# Patient Record
Sex: FEMALE | Race: WHITE | URBAN - METROPOLITAN AREA
[De-identification: names, ages, dates, MRNs, and addresses within clinical notes are randomized per-mention and may not be internally consistent; named-entity substitution may affect disease eponyms.]

---

## 2017-03-27 ENCOUNTER — TELEPHONE (OUTPATIENT)
Dept: FAMILY MEDICINE | Facility: CLINIC | Age: 42
End: 2017-03-27

## 2017-03-27 NOTE — LETTER
March 27, 2017    Kaleigh Felder  [address] [facility] [place] [facility] [address]    Dear Kaleigh    We care about your health and have reviewed your health plan. We have reviewed your medical conditions, medication list, and lab results and are making recommendations based on this review, to better manage your health.    You are in particular need of attention regarding:  - Scheduling a Physical with a Cervical Cancer Screening (Pap Smear) age 64 and younger [phone]      Here is a list of Health Maintenance topics that are due now or due soon:  Health Maintenance Due   Topic Date Due     PAP SCREENING Q3 YR (SYSTEM ASSIGNED)  04/19/2009     We will be calling you in the next couple of weeks to help you schedule any appointments that are needed.  Please call us at [phone] (or use [employer]) to address the above recommendations.     Thank you for trusting [facility] and we appreciate the opportunity to serve you.  We look forward to supporting your healthcare needs in the future.    Healthy Regards,    [facility] Care Team/cll

## 2017-03-27 NOTE — LETTER
April 17, 2017    Kaleigh Felder  [address] [facility] [address]      Dear Kaleigh Felder,     We have tried to contact you about your health, but have been unable to reach you.  Please call us as soon as possible so we can provide you with the best care possible.  We will continue to check in with you throughout the year to complete these items of care, if you are not able to complete these items at this time.  If you would like to complete the missing items for your care, please contact us at [phone].    We recommend the following:  -schedule a PAP SMEAR EXAM which is due.  Please disregard this reminder if you have had this exam elsewhere within the last year.  It would be helpful for us to have a copy of your recent pap smear report in our file so that we can best coordinate your care.  -schedule a PHYSICAL with your provider.    Sincerely,     Your Care Team at [place]

## 2017-03-27 NOTE — TELEPHONE ENCOUNTER
Panel Management Review      Patient has the following on her problem list: None      Composite cancer screening  Chart review shows that this patient is due/due soon for the following Pap Smear  Summary:    Patient is due/failing the following:   PAP and PHYSICAL    Action needed:   Patient needs office visit for physical, pap.    Type of outreach:    Sent letter. 3/27/17    Questions for provider review:    None                                                                                                                                    Sugey Interiano [facility]        Chart routed to Care Team .

## 2017-04-10 NOTE — TELEPHONE ENCOUNTER
Called patient and left a message to return call and schedule an appointment for health maintenance items that are due.  Sugey Interiano CMA

## 2017-04-17 NOTE — TELEPHONE ENCOUNTER
Called patient and left a message to return call and schedule an appointment for health maintenance items that are due. Final PM letter mailed, closing encounter.  Sugey Interiano CMA

## 2017-07-19 ENCOUNTER — TELEPHONE (OUTPATIENT)
Dept: FAMILY MEDICINE | Facility: CLINIC | Age: 42
End: 2017-07-19

## 2017-07-19 NOTE — LETTER
July 19, 2017    Kaleigh Felder  [address] [place] [address]    Dear Kaleigh    We care about your health and have reviewed your health plan. We have reviewed your medical conditions, medication list, and lab results and are making recommendations based on this review, to better manage your health.    You are in particular need of attention regarding:  - Scheduling a Physical with a Cervical Cancer Screening (Pap Smear) age 64 and younger [phone]      Here is a list of Health Maintenance topics that are due now or due soon:  Health Maintenance Due   Topic Date Due     PAP SCREENING Q3 YR (SYSTEM ASSIGNED)  04/19/2009     We will be calling you in the next couple of weeks to help you schedule any appointments that are needed.  Please call us at [phone] (or use [employer]) to address the above recommendations.     Thank you for trusting [facility] and we appreciate the opportunity to serve you.  We look forward to supporting your healthcare needs in the future.    Healthy Regards,    [facility] Care Team/cll

## 2017-07-19 NOTE — TELEPHONE ENCOUNTER
Panel Management Review      Patient has the following on her problem list: None      Composite cancer screening  Chart review shows that this patient is due/due soon for the following Pap Smear  Summary:    Patient is due/failing the following:   PAP and PHYSICAL    Action needed:   Patient needs office visit for physical, pap.    Type of outreach:    Sent letter. 7/19/17    Questions for provider review:    None                                                                                                                                    Sugey Interiano [facility]        Chart routed to Care Team .

## 2017-07-19 NOTE — LETTER
August 9, 2017    Kaleigh Felder  [address] [place] [address]      Dear Kaleigh Felder,     We have tried to contact you about your health, but have been unable to reach you.  Please call us as soon as possible so we can provide you with the best care possible.  We will continue to check in with you throughout the year to complete these items of care, if you are not able to complete these items at this time.  If you would like to complete the missing items for your care, please contact us at [phone].    We recommend the following:  -schedule a PAP SMEAR EXAM which is due.  Please disregard this reminder if you have had this exam elsewhere within the last year.  It would be helpful for us to have a copy of your recent pap smear report in our file so that we can best coordinate your care.  -schedule a PHYSICAL with your provider.    Sincerely,     Your Care Team at [place]

## 2017-08-09 NOTE — TELEPHONE ENCOUNTER
Called patient and left a message to return call and schedule an appointment for health maintenance items that are due. Final PM letter mailed.  Sugey Interiano CMA

## 2017-08-16 DIAGNOSIS — F41.1 GAD (GENERALIZED ANXIETY DISORDER): ICD-10-CM

## 2017-08-16 RX ORDER — ESCITALOPRAM OXALATE 20 MG/1
TABLET ORAL
Qty: 30 TABLET | Refills: 0 | Status: SHIPPED | OUTPATIENT
Start: 2017-08-16 | End: 2017-09-17

## 2017-08-16 NOTE — TELEPHONE ENCOUNTER
escitalopram (LEXAPRO) 20 MG tablet     Last Written Prescription Date: 9-12-16  Last Fill Quantity: 90, # refills: 3  Last Office Visit with [facility] primary care provider:  9-12-16        Last PHQ-9 score on record=   PHQ-9 SCORE 9/12/2016   Total Score -   Total Score 10

## 2017-08-16 NOTE — TELEPHONE ENCOUNTER
Medication is being filled for 1 time refill only due to:  Patient needs to be seen because it has been more than one year since last visit. pt needs to be seen within a month. Amie Khan RN-BSN

## 2017-09-17 DIAGNOSIS — F41.1 GAD (GENERALIZED ANXIETY DISORDER): ICD-10-CM

## 2017-09-18 RX ORDER — ESCITALOPRAM OXALATE 20 MG/1
TABLET ORAL
Qty: 30 TABLET | Refills: 0 | Status: SHIPPED | OUTPATIENT
Start: 2017-09-18 | End: 2017-10-23

## 2017-09-18 NOTE — TELEPHONE ENCOUNTER
Routing refill request to provider for review/approval because:  Patient needs to be seen because it has been more than 1 year since last office visit.  PHQ 9 > 5  Terri Turner, FEI [facility] Triage.

## 2017-09-18 NOTE — TELEPHONE ENCOUNTER
escitalopram (LEXAPRO) 20 MG tablet     Last Written Prescription Date: 8/16/17  Last Fill Quantity: 30, # refills: 0  Last Office Visit with [facility] primary care provider:  9/12/16        Last PHQ-9 score on record=   PHQ-9 SCORE 9/12/2016   Total Score -   Total Score 10

## 2017-10-23 ENCOUNTER — OFFICE VISIT (OUTPATIENT)
Dept: FAMILY MEDICINE | Facility: CLINIC | Age: 42
End: 2017-10-23
Payer: COMMERCIAL

## 2017-10-23 VITALS
SYSTOLIC BLOOD PRESSURE: 133 MMHG | HEART RATE: 78 BPM | HEIGHT: 65 IN | OXYGEN SATURATION: 96 % | WEIGHT: 265 LBS | BODY MASS INDEX: 44.15 KG/M2 | TEMPERATURE: 98.1 F | DIASTOLIC BLOOD PRESSURE: 92 MMHG

## 2017-10-23 DIAGNOSIS — F41.1 GAD (GENERALIZED ANXIETY DISORDER): Primary | ICD-10-CM

## 2017-10-23 PROCEDURE — 99213 OFFICE O/P EST LOW 20 MIN: CPT | Performed by: FAMILY MEDICINE

## 2017-10-23 RX ORDER — ESCITALOPRAM OXALATE 20 MG/1
TABLET ORAL
Qty: 100 TABLET | Refills: 0 | Status: SHIPPED | OUTPATIENT
Start: 2017-10-23 | End: 2017-12-20

## 2017-10-23 ASSESSMENT — ANXIETY QUESTIONNAIRES
7. FEELING AFRAID AS IF SOMETHING AWFUL MIGHT HAPPEN: NOT AT ALL
2. NOT BEING ABLE TO STOP OR CONTROL WORRYING: SEVERAL DAYS
3. WORRYING TOO MUCH ABOUT DIFFERENT THINGS: NOT AT ALL
GAD7 TOTAL SCORE: 3
1. FEELING NERVOUS, ANXIOUS, OR ON EDGE: SEVERAL DAYS
5. BEING SO RESTLESS THAT IT IS HARD TO SIT STILL: NOT AT ALL
6. BECOMING EASILY ANNOYED OR IRRITABLE: SEVERAL DAYS
IF YOU CHECKED OFF ANY PROBLEMS ON THIS QUESTIONNAIRE, HOW DIFFICULT HAVE THESE PROBLEMS MADE IT FOR YOU TO DO YOUR WORK, TAKE CARE OF THINGS AT HOME, OR GET ALONG WITH OTHER PEOPLE: NOT DIFFICULT AT ALL

## 2017-10-23 ASSESSMENT — PAIN SCALES - GENERAL: PAINLEVEL: NO PAIN (0)

## 2017-10-23 ASSESSMENT — PATIENT HEALTH QUESTIONNAIRE - PHQ9
5. POOR APPETITE OR OVEREATING: NOT AT ALL
SUM OF ALL RESPONSES TO PHQ QUESTIONS 1-9: 9

## 2017-10-23 NOTE — NURSING NOTE
"Chief Complaint   Patient presents with     Anxiety       Initial BP (!) 133/92 (BP Location: Left arm, Patient Position: Chair, Cuff Size: Adult Small)  Pulse 78  Temp 98.1  F (36.7  C) (Oral)  Ht 5' 5\" (1.651 m)  Wt 265 lb (120.2 kg)  LMP 10/21/2017  SpO2 96%  BMI 44.1 kg/m2 Estimated body mass index is 44.1 kg/(m^2) as calculated from the following:    Height as of this encounter: 5' 5\" (1.651 m).    Weight as of this encounter: 265 lb (120.2 kg).  Medication Reconciliation: complete    Melanie Weiss MA    "

## 2017-10-23 NOTE — MR AVS SNAPSHOT
"              After Visit Summary   10/23/2017    Kaleigh Felder    MRN: [id number]           Patient Information     Date Of Birth          1975        Visit Information        Provider Department      10/23/2017 1:20 PM Engelmann, Lauren Anneliese, MD [facility]        Today's Diagnoses     BENJAMIN (generalized anxiety disorder)    -  1      Care Instructions    Come back when your schedule settles down for a physical.   Let me know when your dose of medication changes.          Follow-ups after your visit        Who to contact     If you have questions or need follow up information about today's clinic visit or your schedule please contact [facility] directly at [phone].  Normal or non-critical lab and imaging results will be communicated to you by MyChart, letter or phone within 4 business days after the clinic has received the results. If you do not hear from us within 7 days, please contact the clinic through MyChart or phone. If you have a critical or abnormal lab result, we will notify you by phone as soon as possible.  Submit refill requests through [employer] or call your pharmacy and they will forward the refill request to us. Please allow 3 business days for your refill to be completed.          Additional Information About Your Visit        MyChart Information     [employer] lets you send messages to your doctor, view your test results, renew your prescriptions, schedule appointments and more. To sign up, go to [email or web address] . Click on \"Log in\" on the left side of the screen, which will take you to the Welcome page. Then click on \"Sign up Now\" on the right side of the page.     You will be asked to enter the access code listed below, as well as some personal information. Please follow the directions to create your username and password.     Your access code is: [email or web address]  Expires: 2018  1:50 PM     Your access code will  in 90 days. If you " "need help or a new code, please call your [facility] clinic or [phone].        Care EveryWhere ID     This is your Care EveryWhere ID. This could be used by other organizations to access your [facility] medical records  [id number]        Your Vitals Were     Pulse Temperature Height Last Period Pulse Oximetry BMI (Body Mass Index)    78 98.1  F (36.7  C) (Oral) 5' 5\" (1.651 m) 10/21/2017 96% 44.1 kg/m2       Blood Pressure from Last 3 Encounters:   10/23/17 (!) 133/92   09/12/16 115/84   12/10/14 124/76    Weight from Last 3 Encounters:   10/23/17 265 lb (120.2 kg)   09/12/16 258 lb (117 kg)   12/10/14 242 lb (109.8 kg)              Today, you had the following     No orders found for display         Today's Medication Changes          These changes are accurate as of: 10/23/17  1:50 PM.  If you have any questions, ask your nurse or doctor.               These medicines have changed or have updated prescriptions.        Dose/Directions    escitalopram 20 MG tablet   Commonly known as:  LEXAPRO   This may have changed:  See the new instructions.   Used for:  BENJAMIN (generalized anxiety disorder)   Changed by:  Engelmann, Lauren Anneliese, MD        Take one and a half tablets daily for 1 month, then go back to 1 tablet per day.   Quantity:  100 tablet   Refills:  0            Where to get your medicines      These medications were sent to [facility]/pharmacy #[id number] - [address] AT CORNER OF [address] [place] [address]     Phone:  [phone]     escitalopram 20 MG tablet                Primary Care Provider Office Phone # Fax #    [facility] [phone]       [address] [facility] [address]        Equal Access to Services     KALPANA SPARKS : Ania Lay, waaxda luyvette, qaybta kaalmada kianna, oralia hedrick. So [facility] [phone].    ATENCIÓN: Si habla español, tiene a long disposición " servicios gratuitos de asistencia lingüística. [facility] [phone].    We comply with applicable federal civil rights laws and [place] laws. We do not discriminate on the basis of race, color, national origin, age, disability, sex, sexual orientation, or gender identity.            Thank you!     Thank you for choosing [facility]  for your care. Our goal is always to provide you with excellent care. Hearing back from our patients is one way we can continue to improve our services. Please take a few minutes to complete the written survey that you may receive in the mail after your visit with us. Thank you!             Your Updated Medication List - Protect others around you: Learn how to safely use, store and throw away your medicines at www.disposemymeds.org.          This list is accurate as of: 10/23/17  1:50 PM.  Always use your most recent med list.                   Brand Name Dispense Instructions for use Diagnosis    EQL COQ10 300 MG Caps   Generic drug:  Coenzyme Q10      Take 1 capsule by mouth daily        escitalopram 20 MG tablet    LEXAPRO    100 tablet    Take one and a half tablets daily for 1 month, then go back to 1 tablet per day.    BENJAMIN (generalized anxiety disorder)       VITAMINS/MINERALS PO      Take  by mouth.

## 2017-10-23 NOTE — PROGRESS NOTES
"  SUBJECTIVE:   Kaleigh Felder is a 42 year old female who presents to clinic today for the following health issues:    Depression and Anxiety Follow-Up    Status since last visit: Worsened -  has been having seizures and is having a hard time with that.    Other associated symptoms:None    Complicating factors:     Significant life event:  being ill, promotion at work    Current substance abuse: None    PHQ-9 Score and MyChart F/U Questions 9/12/2016 10/23/2017   Total Score 10 9   Q9: Suicide Ideation Not at all Not at all     BENJAMIN-7 SCORE 9/19/2013 9/12/2016 10/23/2017   Total Score 14 - -   Total Score - 6 3     PHQ-9  English  PHQ-9   Any Language  GAD7  Suicide Assessment Five-step Evaluation and Treatment (SAFE-T)      Amount of exercise or physical activity: Walking sometimes    Problems taking medications regularly: No    Medication side effects: none    Diet: regular (no restrictions)    Has been on it for about 10 years and doing very well, initially started on it for BENJAMIN.   Last week and a half has been taking 30mg of Lexapro. Her prior PCP advised her to do this.     Problem list and histories reviewed & adjusted, as indicated.  Additional history: as documented      Reviewed and updated as needed this visit by clinical staffTobacco  Allergies  Meds  Med Hx  Surg Hx  Fam Hx  Soc Hx      Reviewed and updated as needed this visit by Provider         ROS:  Constitutional, HEENT, cardiovascular, pulmonary, gi and gu systems are negative, except as otherwise noted.      OBJECTIVE:   BP (!) 133/92 (BP Location: Left arm, Patient Position: Chair, Cuff Size: Adult Small)  Pulse 78  Temp 98.1  F (36.7  C) (Oral)  Ht 5' 5\" (1.651 m)  Wt 265 lb (120.2 kg)  LMP 10/21/2017  SpO2 96%  BMI 44.1 kg/m2  Body mass index is 44.1 kg/(m^2).  GENERAL: healthy, alert, no distress and obese  EYES: Eyes grossly normal to inspection, PERRL and conjunctivae and sclerae normal  NECK: no adenopathy, no " "asymmetry, masses, or scars and thyroid normal to palpation  RESP: lungs clear to auscultation - no rales, rhonchi or wheezes  CV: regular rate and rhythm, normal S1 S2, no S3 or S4, no murmur, click or rub, no peripheral edema and peripheral pulses strong  PSYCH: MENTAL STATUS EXAM  Appearance: appropriate, well-groomed   Attitude: cooperative, engaged in conversation   Behavior: normal, no psychomotor agitation or retardation   Eye Contact: normal  Speech: normal valentin, normal volume   Orientation: oriented to person, place, time and situation  Mood: \"stressed\"  Affect: bright, mood-congruent   Thought Process: tangential but redirectable   Suicidal Ideation: No passive or active SI or plan, no thoughts of self-harm, reports children as protective factor   Hallucination: no A or V hallucinations      Diagnostic Test Results:  none     ASSESSMENT/PLAN:       ICD-10-CM    1. BENJAMIN (generalized anxiety disorder) F41.1 escitalopram (LEXAPRO) 20 MG tablet     Discussed risk of taking over the recommended dose of Lexapro, including risk of QT prolongation and bleeding risk. She is adamant that she was told she could do this for a short time by her previous provider.  I advised against it but she said she would do it anyway. Will give her enough tabs to cover to avoid withdrawal syndrome.     See Patient Instructions    Lauren A. Engelmann, MD  [facility]    "

## 2017-10-23 NOTE — PATIENT INSTRUCTIONS
Come back when your schedule settles down for a physical.   Let me know when your dose of medication changes.

## 2017-10-24 ASSESSMENT — ANXIETY QUESTIONNAIRES: GAD7 TOTAL SCORE: 3

## 2017-12-20 ENCOUNTER — TELEPHONE (OUTPATIENT)
Dept: FAMILY MEDICINE | Facility: CLINIC | Age: 42
End: 2017-12-20

## 2017-12-20 DIAGNOSIS — F41.1 GAD (GENERALIZED ANXIETY DISORDER): ICD-10-CM

## 2017-12-21 ENCOUNTER — TELEPHONE (OUTPATIENT)
Dept: FAMILY MEDICINE | Facility: CLINIC | Age: 42
End: 2017-12-21

## 2017-12-21 NOTE — LETTER
December 21, 2017    Kaleigh Felder  [address] [place] [address]    Dear Kaleigh    We care about your health and have reviewed your health plan. We have reviewed your medical conditions, medication list, and lab results and are making recommendations based on this review, to better manage your health.    You are in particular need of attention regarding:  - Scheduling a Physical with a Cervical Cancer Screening (Pap Smear) age 64 and younger [phone]      Here is a list of Health Maintenance topics that are due now or due soon:  Health Maintenance Due   Topic Date Due     PAP SCREENING Q3 YR (SYSTEM ASSIGNED)  04/19/2009     INFLUENZA VACCINE (SYSTEM ASSIGNED)  09/01/2017     We will be calling you in the next couple of weeks to help you schedule any appointments that are needed.  Please call us at [phone] (or use [employer]) to address the above recommendations.     Thank you for trusting [facility] and we appreciate the opportunity to serve you.  We look forward to supporting your healthcare needs in the future.    Healthy Regards,    Dr. Engelmann/myra

## 2017-12-21 NOTE — TELEPHONE ENCOUNTER
Panel Management Review      Patient has the following on her problem list: None      Composite cancer screening  Chart review shows that this patient is due/due soon for the following Pap Smear  Summary:    Patient is due/failing the following:   PAP and PHYSICAL    Action needed:   Patient needs office visit for physical, pap.    Type of outreach:    Sent letter. 12/21/17    Questions for provider review:    None                                                                                                                                    Sugey Interiano [facility]        Chart routed to Care Team .

## 2017-12-22 RX ORDER — ESCITALOPRAM OXALATE 20 MG/1
20 TABLET ORAL DAILY
Qty: 90 TABLET | Refills: 1 | Status: SHIPPED | OUTPATIENT
Start: 2017-12-22 | End: 2017-12-29

## 2017-12-22 NOTE — TELEPHONE ENCOUNTER
Rehan with [facility] calling to check on status of refill request. Routing as patient calls to address request.

## 2017-12-22 NOTE — TELEPHONE ENCOUNTER
Patient requesting refill of Lexapro.   From last OV 10/23/17:  1. BENJAMIN (generalized anxiety disorder) F41.1 escitalopram (LEXAPRO) 20 MG tablet      Discussed risk of taking over the recommended dose of Lexapro, including risk of QT prolongation and bleeding risk. She is adamant that she was told she could do this for a short time by her previous provider.  I advised against it but she said she would do it anyway. Will give her enough tabs to cover to avoid withdrawal syndrome.      Routed to provider to advise.  Lisa Arrington RN  [facility]

## 2017-12-28 NOTE — TELEPHONE ENCOUNTER
PT made appointment and now would like to know if medication refill can be sent to pharmacy or dose she need to jony until she is seen for the refill

## 2017-12-29 RX ORDER — ESCITALOPRAM OXALATE 20 MG/1
20 TABLET ORAL DAILY
Qty: 90 TABLET | Refills: 1 | Status: SHIPPED | OUTPATIENT
Start: 2017-12-29 | End: 2018-06-21

## 2018-01-04 ENCOUNTER — OFFICE VISIT (OUTPATIENT)
Dept: FAMILY MEDICINE | Facility: CLINIC | Age: 43
End: 2018-01-04
Payer: COMMERCIAL

## 2018-01-04 VITALS
SYSTOLIC BLOOD PRESSURE: 131 MMHG | HEART RATE: 72 BPM | BODY MASS INDEX: 43.49 KG/M2 | HEIGHT: 65 IN | DIASTOLIC BLOOD PRESSURE: 81 MMHG | TEMPERATURE: 97.4 F | WEIGHT: 261 LBS

## 2018-01-04 DIAGNOSIS — Z01.419 WELL WOMAN EXAM WITH ROUTINE GYNECOLOGICAL EXAM: Primary | ICD-10-CM

## 2018-01-04 DIAGNOSIS — Z13.21 ENCOUNTER FOR VITAMIN DEFICIENCY SCREENING: ICD-10-CM

## 2018-01-04 DIAGNOSIS — Z13.220 SCREENING FOR LIPID DISORDERS: ICD-10-CM

## 2018-01-04 DIAGNOSIS — Z13.1 SCREENING FOR DIABETES MELLITUS: ICD-10-CM

## 2018-01-04 LAB
ANION GAP SERPL CALCULATED.3IONS-SCNC: 7 MMOL/L (ref 3–14)
BUN SERPL-MCNC: 13 MG/DL (ref 7–30)
CALCIUM SERPL-MCNC: 8.6 MG/DL (ref 8.5–10.1)
CHLORIDE SERPL-SCNC: 103 MMOL/L (ref 94–109)
CHOLEST SERPL-MCNC: 165 MG/DL
CO2 SERPL-SCNC: 28 MMOL/L (ref 20–32)
CREAT SERPL-MCNC: 0.72 MG/DL (ref 0.52–1.04)
GFR SERPL CREATININE-BSD FRML MDRD: 89 ML/MIN/1.7M2
GLUCOSE SERPL-MCNC: 127 MG/DL (ref 70–99)
HDLC SERPL-MCNC: 45 MG/DL
LDLC SERPL CALC-MCNC: 100 MG/DL
NONHDLC SERPL-MCNC: 120 MG/DL
POTASSIUM SERPL-SCNC: 3.8 MMOL/L (ref 3.4–5.3)
SODIUM SERPL-SCNC: 138 MMOL/L (ref 133–144)
TRIGL SERPL-MCNC: 102 MG/DL

## 2018-01-04 PROCEDURE — 82306 VITAMIN D 25 HYDROXY: CPT | Performed by: FAMILY MEDICINE

## 2018-01-04 PROCEDURE — 80048 BASIC METABOLIC PNL TOTAL CA: CPT | Performed by: FAMILY MEDICINE

## 2018-01-04 PROCEDURE — 80061 LIPID PANEL: CPT | Performed by: FAMILY MEDICINE

## 2018-01-04 PROCEDURE — 99396 PREV VISIT EST AGE 40-64: CPT | Performed by: FAMILY MEDICINE

## 2018-01-04 PROCEDURE — G0145 SCR C/V CYTO,THINLAYER,RESCR: HCPCS | Performed by: FAMILY MEDICINE

## 2018-01-04 PROCEDURE — 36415 COLL VENOUS BLD VENIPUNCTURE: CPT | Performed by: FAMILY MEDICINE

## 2018-01-04 PROCEDURE — 87624 HPV HI-RISK TYP POOLED RSLT: CPT | Performed by: FAMILY MEDICINE

## 2018-01-04 NOTE — NURSING NOTE
"Chief Complaint   Patient presents with     Physical       Initial /81  Pulse 72  Temp 97.4  F (36.3  C) (Oral)  Ht 5' 5\" (1.651 m)  Wt 261 lb (118.4 kg)  LMP 12/17/2017  BMI 43.43 kg/m2 Estimated body mass index is 43.43 kg/(m^2) as calculated from the following:    Height as of this encounter: 5' 5\" (1.651 m).    Weight as of this encounter: 261 lb (118.4 kg).  Medication Reconciliation: complete  Cinthia Downey MA    "

## 2018-01-04 NOTE — PROGRESS NOTES
SUBJECTIVE:   CC: Kaleigh Felder is an 42 year old woman who presents for preventive health visit.     Physical   Annual:     Getting at least 3 servings of Calcium per day::  NO    Bi-annual eye exam::  Yes    Dental care twice a year::  NO    Sleep apnea or symptoms of sleep apnea::  Daytime drowsiness    Frequency of exercise::  None    Taking medications regularly::  Yes            Today's PHQ-2 Score:   PHQ-2 ( 1999 Pfizer) 1/4/2018   Q1: Little interest or pleasure in doing things 0   Q2: Feeling down, depressed or hopeless 0   PHQ-2 Score 0   Q1: Little interest or pleasure in doing things Not at all   Q2: Feeling down, depressed or hopeless Not at all   PHQ-2 Score 0       Abuse: Current or Past(Physical, Sexual or Emotional)- No  Do you feel safe in your environment - Yes    Social History   Substance Use Topics     Smoking status: Never Smoker     Smokeless tobacco: Never Used     Alcohol use No     Alcohol Use 1/4/2018   If you drink alcohol, do you typically have greater than 3 drinks per day OR greater than 7 drinks per week?   No       Reviewed orders with patient.  Reviewed health maintenance and updated orders accordingly - Yes  Labs reviewed in EPIC  BP Readings from Last 3 Encounters:   01/04/18 131/81   10/23/17 (!) 133/92   09/12/16 115/84    Wt Readings from Last 3 Encounters:   01/04/18 261 lb (118.4 kg)   10/23/17 265 lb (120.2 kg)   09/12/16 258 lb (117 kg)                      Mammo discussed, not appropriate for or declined by this patient.  Patient under age 50, mutual decision reflected in health maintenance.        Pertinent mammograms are reviewed under the imaging tab.  History of abnormal Pap smear: NO - age 30-65 PAP every 5 years with negative HPV co-testing recommended    Reviewed and updated as needed this visit by clinical staffTobacco  Allergies  Meds  Med Hx  Surg Hx  Fam Hx  Soc Hx        Reviewed and updated as needed this visit by Provider            Review of  "Systems  C: NEGATIVE for fever, chills, change in weight  I: NEGATIVE for worrisome rashes, moles or lesions  E: NEGATIVE for vision changes or irritation  ENT: NEGATIVE for ear, mouth and throat problems  R: NEGATIVE for significant cough or SOB  B: NEGATIVE for masses, tenderness or discharge  CV: NEGATIVE for chest pain, palpitations or peripheral edema  GI: NEGATIVE for nausea, abdominal pain, heartburn, or change in bowel habits  : NEGATIVE for unusual urinary or vaginal symptoms. Periods are regular.  M: NEGATIVE for significant arthralgias or myalgia  N: NEGATIVE for weakness, dizziness or paresthesias  P: NEGATIVE for changes in mood or affect     OBJECTIVE:   /81  Pulse 72  Temp 97.4  F (36.3  C) (Oral)  Ht 5' 5\" (1.651 m)  Wt 261 lb (118.4 kg)  LMP 12/17/2017  BMI 43.43 kg/m2  Physical Exam  GENERAL: healthy, alert, no distress and obese  EYES: Eyes grossly normal to inspection, PERRL and conjunctivae and sclerae normal  HENT: ear canals and TM's normal, nose and mouth without ulcers or lesions  NECK: no adenopathy, no asymmetry, masses, or scars and thyroid normal to palpation  RESP: lungs clear to auscultation - no rales, rhonchi or wheezes  CV: regular rate and rhythm, normal S1 S2, no S3 or S4, no murmur, click or rub, no peripheral edema and peripheral pulses strong  ABDOMEN: soft, nontender, no hepatosplenomegaly, no masses and bowel sounds normal   (female): normal female external genitalia, normal urethral meatus, vaginal mucosa, normal cervix/adnexa/uterus without masses or discharge  MS: no gross musculoskeletal defects noted, no edema  SKIN: no suspicious lesions or rashes  BACK: no CVA tenderness, no paralumbar tenderness  PSYCH: mentation appears normal, affect normal/bright    ASSESSMENT/PLAN:       ICD-10-CM    1. Well woman exam with routine gynecological exam Z01.419 Pap imaged thin layer screen with HPV - recommended age 30 - 65 years (select HPV order below)     HPV High " "Risk Types DNA Cervical   2. Need for prophylactic vaccination and inoculation against influenza Z23    3. Screening for lipid disorders Z13.220 Lipid panel reflex to direct LDL Fasting   4. Encounter for vitamin deficiency screening Z13.21 Vitamin D Deficiency   5. Screening for diabetes mellitus Z13.1 Basic metabolic panel       COUNSELING:  Reviewed preventive health counseling, as reflected in patient instructions       Regular exercise       Healthy diet/nutrition       Contraception         reports that she has never smoked. She has never used smokeless tobacco.    Estimated body mass index is 43.43 kg/(m^2) as calculated from the following:    Height as of this encounter: 5' 5\" (1.651 m).    Weight as of this encounter: 261 lb (118.4 kg).   Weight management plan: Discussed healthy diet and exercise guidelines and patient will follow up in 6 months in clinic to re-evaluate.    Counseling Resources:  ATP IV Guidelines  Pooled Cohorts Equation Calculator  Breast Cancer Risk Calculator  FRAX Risk Assessment  ICSI Preventive Guidelines  Dietary Guidelines for Americans, 2010  USDA's MyPlate  ASA Prophylaxis  Lung CA Screening    Lauren A. Engelmann, MD  [facility]  Answers for HPI/ROS submitted by the patient on 1/4/2018   PHQ-2 Score: 0    "

## 2018-01-04 NOTE — MR AVS SNAPSHOT
"              After Visit Summary   1/4/2018    Kaleigh Felder    MRN: [id number]           Patient Information     Date Of Birth          1975        Visit Information        Provider Department      1/4/2018 8:00 AM Engelmann, Lauren Anneliese, MD [facility]        Today's Diagnoses     Well woman exam with routine gynecological exam    -  1    Need for prophylactic vaccination and inoculation against influenza        Screening for lipid disorders        Encounter for vitamin deficiency screening        Screening for diabetes mellitus           Follow-ups after your visit        Who to contact     If you have questions or need follow up information about today's clinic visit or your schedule please contact [facility] directly at [phone].  Normal or non-critical lab and imaging results will be communicated to you by MyChart, letter or phone within 4 business days after the clinic has received the results. If you do not hear from us within 7 days, please contact the clinic through MyChart or phone. If you have a critical or abnormal lab result, we will notify you by phone as soon as possible.  Submit refill requests through [employer] or call your pharmacy and they will forward the refill request to us. Please allow 3 business days for your refill to be completed.          Additional Information About Your Visit        MyChart Information     [employer] lets you send messages to your doctor, view your test results, renew your prescriptions, schedule appointments and more. To sign up, go to [email or web address] . Click on \"Log in\" on the left side of the screen, which will take you to the Welcome page. Then click on \"Sign up Now\" on the right side of the page.     You will be asked to enter the access code listed below, as well as some personal information. Please follow the directions to create your username and password.     Your access code is: [email or web address]  Expires: " "2018 12:50 PM     Your access code will  in 90 days. If you need help or a new code, please call your [facility] or [phone].        Care EveryWhere ID     This is your Care EveryWhere ID. This could be used by other organizations to access your [place] medical records  [id number]        Your Vitals Were     Pulse Temperature Height Last Period BMI (Body Mass Index)       72 97.4  F (36.3  C) (Oral) 5' 5\" (1.651 m) 2017 43.43 kg/m2        Blood Pressure from Last 3 Encounters:   18 131/81   10/23/17 (!) 133/92   16 115/84    Weight from Last 3 Encounters:   18 261 lb (118.4 kg)   10/23/17 265 lb (120.2 kg)   16 258 lb (117 kg)              We Performed the Following     Basic metabolic panel     HPV High Risk Types DNA Cervical     Lipid panel reflex to direct LDL Fasting     Pap imaged thin layer screen with HPV - recommended age 30 - 65 years (select HPV order below)     Vitamin D Deficiency        Primary Care Provider Office Phone # Fax #    [facility] [phone]       [address] [place] [address]        Equal Access to Services     KALPANA SPARKS : Hadii jasmin ku hadasho Soomaali, waaxda luqadaha, qaybta kaalmada adeegyada, waxay pain fan hedrick. So [facility] [phone].    ATENCIÓN: Si habla español, tiene a long disposición servicios gratuitos de asistencia lingüística. Llame al [phone].    We comply with applicable federal civil rights laws and [place] laws. We do not discriminate on the basis of race, color, national origin, age, disability, sex, sexual orientation, or gender identity.            Thank you!     Thank you for choosing [facility]  for your care. Our goal is always to provide you with excellent care. Hearing back from our patients is one way we can continue to improve our services. Please take a few minutes to complete the written survey that " you may receive in the mail after your visit with us. Thank you!             Your Updated Medication List - Protect others around you: Learn how to safely use, store and throw away your medicines at www.disposemymeds.org.          This list is accurate as of: 1/4/18  9:05 AM.  Always use your most recent med list.                   Brand Name Dispense Instructions for use Diagnosis    EQL COQ10 300 MG Caps   Generic drug:  Coenzyme Q10      Take 1 capsule by mouth daily        escitalopram 20 MG tablet    LEXAPRO    90 tablet    Take 1 tablet (20 mg) by mouth daily    BENJAMIN (generalized anxiety disorder)       VITAMINS/MINERALS PO      Take  by mouth.

## 2018-01-05 LAB — DEPRECATED CALCIDIOL+CALCIFEROL SERPL-MC: 13 UG/L (ref 20–75)

## 2018-01-06 LAB
COPATH REPORT: NORMAL
PAP: NORMAL

## 2018-01-09 LAB
FINAL DIAGNOSIS: NORMAL
HPV HR 12 DNA CVX QL NAA+PROBE: NEGATIVE
HPV16 DNA SPEC QL NAA+PROBE: NEGATIVE
HPV18 DNA SPEC QL NAA+PROBE: NEGATIVE
SPECIMEN DESCRIPTION: NORMAL

## 2018-01-11 ENCOUNTER — TELEPHONE (OUTPATIENT)
Dept: FAMILY MEDICINE | Facility: CLINIC | Age: 43
End: 2018-01-11

## 2018-01-11 DIAGNOSIS — R73.01 ELEVATED FASTING GLUCOSE: ICD-10-CM

## 2018-01-11 DIAGNOSIS — R73.01 ELEVATED FASTING GLUCOSE: Primary | ICD-10-CM

## 2018-01-11 LAB — HBA1C MFR BLD: 5.4 % (ref 4.3–6)

## 2018-01-11 PROCEDURE — 36415 COLL VENOUS BLD VENIPUNCTURE: CPT | Performed by: FAMILY MEDICINE

## 2018-01-11 PROCEDURE — 83036 HEMOGLOBIN GLYCOSYLATED A1C: CPT | Performed by: FAMILY MEDICINE

## 2018-01-11 NOTE — PROGRESS NOTES
Please call Kaleigh and let her know that A1C is normal. No diabetes or prediabetes. Thanks!    Lauren Engelmann, MD

## 2018-01-11 NOTE — TELEPHONE ENCOUNTER
Lab Results   Component Value Date    A1C 5.4 01/11/2018    A1C 5.3 07/08/2014         Patient had office visit with Dr. Engelmann on 1/4/18.    Attempted to call patient at home number, left message on answering service requesting call back to clinic to discuss.        I see other labs done on 1/4/18 don't appear to have a result note.    Routed to Dr. Engelmann to address the other lab results as well.    Hawa Pacheco RN  [facility]

## 2018-01-12 NOTE — TELEPHONE ENCOUNTER
Patient returned call to RN line which I picked up, advised her of A1C result and note per provider (no diabetes or pre-diabetes).  She verbalized no concerns at this time.    Hawa Pacheco RN  [facility]

## 2018-01-12 NOTE — TELEPHONE ENCOUNTER
Called patient at [phone] (home). Left message on voicemail to return phone call to triage.  Terri Turner RN [facility] Triage.

## 2018-06-21 DIAGNOSIS — F41.1 GAD (GENERALIZED ANXIETY DISORDER): ICD-10-CM

## 2018-06-22 RX ORDER — ESCITALOPRAM OXALATE 20 MG/1
TABLET ORAL
Qty: 90 TABLET | Refills: 1 | Status: SHIPPED | OUTPATIENT
Start: 2018-06-22 | End: 2018-10-16

## 2018-06-22 NOTE — TELEPHONE ENCOUNTER
"Requested Prescriptions   Pending Prescriptions Disp Refills     escitalopram (LEXAPRO) 20 MG tablet [Pharmacy Med Name: ESCITALOPRAM 20 MG TABLET] 90 tablet 1    Last Written Prescription Date:  12-29-17  Last Fill Quantity: 90,  # refills: 1   Last office visit: 1/4/2018 with prescribing provider:     Future Office Visit:     Sig: TAKE 1 TABLET (20 MG) BY MOUTH DAILY    SSRIs Protocol Passed    6/21/2018 11:54 PM       Passed - Recent (12 mo) or future (30 days) visit within the authorizing provider's specialty    Patient had office visit in the last 12 months or has a visit in the next 30 days with authorizing provider or within the authorizing provider's specialty.  See \"Patient Info\" tab in inbasket, or \"Choose Columns\" in Meds & Orders section of the refill encounter.           Passed - Patient is age 18 or older       Passed - No active pregnancy on record       Passed - No positive pregnancy test in last 12 months          "

## 2018-06-22 NOTE — TELEPHONE ENCOUNTER
Prescription approved per [facility] Refill Protocol.    Charles Rocha RN

## 2018-07-27 NOTE — LETTER
January 11, 2018    Kaleigh Felder  [address] [place] [id number]    Dear Kaleigh,  We are happy to inform you that your PAP smear result from 1/4/18 is normal.  We are now able to do a follow up test on PAP smears. The DNA test is for HPV (Human Papilloma Virus). Cervical cancer is closely linked with certain types of HPV. Your result showed no evidence of high risk HPV.  Therefore we recommend you return in 5 years for your next pap smear and HPV test.  You will still need to return to the clinic every year for an annual exam and other preventive tests.  Please contact the clinic at [phone] with any questions.  Sincerely,    Lauren A. Engelmann, MD/day   Stable

## 2018-10-16 ENCOUNTER — TELEPHONE (OUTPATIENT)
Dept: FAMILY MEDICINE | Facility: CLINIC | Age: 43
End: 2018-10-16

## 2018-10-16 DIAGNOSIS — F41.1 GAD (GENERALIZED ANXIETY DISORDER): ICD-10-CM

## 2018-10-16 NOTE — TELEPHONE ENCOUNTER
Reason for Call:  Medication or medication refill:    Do you use a [facility]?  Name of the pharmacy and [place]    Name of the medication requested: Escitalopram (LEXAPRO)     Other request: Patient also requested that since her insurance has changed, she will need that from now on all her refills be sent to [facility], [place], [place].    Can we leave a detailed message on this number? YES    Phone number patient can be reached at: Cell number on file:    Telephone Information:   Mobile [phone]       Best Time: Anytime    Call taken on 10/16/2018 at 3:20 PM by Raven Dan        Call taken on 10/16/2018 at 3:17 PM by Raven Dan

## 2018-10-16 NOTE — TELEPHONE ENCOUNTER
Patient has one more refill at [facility].  Called patient at [phone] (home).  Left a message on voicemail to return call to triage line.    Patient can call and have script transferred to [facility].    Terri Turner RN [facility] Triage.

## 2018-10-18 NOTE — TELEPHONE ENCOUNTER
Called patient at [phone] (home). Left message on voicemail to return phone call to triage line at [phone].  Terri Turner RN [facility] Triage.

## 2018-10-19 RX ORDER — ESCITALOPRAM OXALATE 20 MG/1
TABLET ORAL
Qty: 90 TABLET | Refills: 0 | Status: SHIPPED | OUTPATIENT
Start: 2018-10-19 | End: 2018-12-28

## 2018-10-19 NOTE — TELEPHONE ENCOUNTER
Called patient at [phone] (home). Left message to return phone call to triage.  Terri Turner RN [facility] Triage.

## 2018-10-19 NOTE — TELEPHONE ENCOUNTER
Called [phone] (home). Someone picked up phone and stated that there is no one here by this name.    Sent script to [facility] and cancelled at [facility].  Terri Turner RN [facility] Triage.

## 2018-12-27 DIAGNOSIS — F41.1 GAD (GENERALIZED ANXIETY DISORDER): ICD-10-CM

## 2018-12-27 RX ORDER — ESCITALOPRAM OXALATE 20 MG/1
TABLET ORAL
Qty: 90 TABLET | Refills: 0 | Status: CANCELLED | OUTPATIENT
Start: 2018-12-27

## 2018-12-27 NOTE — TELEPHONE ENCOUNTER
"Requested Prescriptions   Pending Prescriptions Disp Refills     escitalopram (LEXAPRO) 20 MG tablet 90 tablet 0    Last Written Prescription Date:  10/19/18  Last Fill Quantity: 90,  # refills: 0   Last office visit: 1/4/2018 with prescribing provider:  Engelmann   Future Office Visit:     Sig: TAKE 1 TABLET (20 MG) BY MOUTH DAILY    SSRIs Protocol Passed - 12/27/2018  5:12 PM       Passed - Recent (12 mo) or future (30 days) visit within the authorizing provider's specialty    Patient had office visit in the last 12 months or has a visit in the next 30 days with authorizing provider or within the authorizing provider's specialty.  See \"Patient Info\" tab in inbasket, or \"Choose Columns\" in Meds & Orders section of the refill encounter.             Passed - Patient is age 18 or older       Passed - No active pregnancy on record       Passed - No positive pregnancy test in last 12 months          "

## 2018-12-28 RX ORDER — ESCITALOPRAM OXALATE 20 MG/1
TABLET ORAL
Qty: 30 TABLET | Refills: 0 | Status: SHIPPED | OUTPATIENT
Start: 2018-12-28 | End: 2019-01-18

## 2018-12-28 NOTE — TELEPHONE ENCOUNTER
Note             Requested Prescriptions   Pending Prescriptions Disp Refills     escitalopram (LEXAPRO) 20 MG tablet 90 tablet 0       Sig: TAKE 1 TABLET (20 MG) BY MOUTH DAILY     There is no refill protocol information for this order          Last refill 10/19/18 # 90  Last OV 1/4/18

## 2018-12-28 NOTE — TELEPHONE ENCOUNTER
Routed to provider to advise on refill as patient has not been seen for anxiety in over a year according to epic.  Terri Turner, RN [facility] Triage.

## 2019-02-27 DIAGNOSIS — F41.1 GAD (GENERALIZED ANXIETY DISORDER): ICD-10-CM

## 2019-02-28 NOTE — TELEPHONE ENCOUNTER
"Requested Prescriptions   Pending Prescriptions Disp Refills     escitalopram (LEXAPRO) 20 MG tablet [Pharmacy Med Name: Escitalopram Oxalate Oral Tablet 20 MG] 30 tablet 0    Last Written Prescription Date:  1-21-19  Last Fill Quantity: 30,  # refills: 0   Last office visit: 1/4/2018 with prescribing provider:  1-4-19   Future Office Visit:     Sig: take 1 tablet by mouth daily    SSRIs Protocol Failed - 2/27/2019  9:25 PM       Failed - Recent (12 mo) or future (30 days) visit within the authorizing provider's specialty    Patient had office visit in the last 12 months or has a visit in the next 30 days with authorizing provider or within the authorizing provider's specialty.  See \"Patient Info\" tab in inbasket, or \"Choose Columns\" in Meds & Orders section of the refill encounter.             Passed - Medication is active on med list       Passed - Patient is age 18 or older       Passed - No active pregnancy on record       Passed - No positive pregnancy test in last 12 months          "

## 2019-02-28 NOTE — TELEPHONE ENCOUNTER
"PHQ-9 score:    PHQ-9 SCORE 10/23/2017   PHQ-9 Total Score -   PHQ-9 Total Score 9     Attempted to call patient at home/mobile number, left message on voicemail but the name on the message does not sound like \"Kaleigh Felder\" or her EC's name; patient was instructed to return call to [facility] RN directly on the RN call back line at [phone]; if wrong number, requested call back to advise us of that as well.    Hawa Pacheco, RN  [facility]    "

## 2019-02-28 NOTE — TELEPHONE ENCOUNTER
TC:  Please outreach to patient.  She is overdue for a yearly appt with PCP  Patient was last seen 1/4/2018.  Thank you.  Angela Martins RN

## 2019-03-21 RX ORDER — ESCITALOPRAM OXALATE 20 MG/1
TABLET ORAL
Qty: 30 TABLET | Refills: 0 | Status: SHIPPED | OUTPATIENT
Start: 2019-03-21

## 2019-03-21 NOTE — TELEPHONE ENCOUNTER
Routing refill request to provider for review/approval because:  Multiple attempts to reach patient for follow up appointment. Letter mailed. Original request from 2/27/19, break in medication at this time.     Madelyn Piedra RN

## 2022-08-10 NOTE — TELEPHONE ENCOUNTER
Kim is a very pleasant 46 year old female who was seen today for sinus problem, asthma, follow up and nasal congestion.  She was evaluated initially via an evisit, as a follow up to an urgent care visit (where she was prescribed doxycycline) then advised to come in, I reviewed all the notes.    She endorses that the symptoms started at least two weeks ago, but the cough is still prominent.  She tested negative for covid. She finished the course of doxy as prescribed.  The cough is productive of yellow mucous and she still has a lot of nasal congestion and sinus drainage.  Overall, though, she thinks the symptoms are slowly getting better.  She has a history of asthma and has been using albuterol more often than at baseline.  She is already taking singulair at baseline.    Current Outpatient Medications:      albuterol (PROAIR HFA/PROVENTIL HFA/VENTOLIN HFA) 108 (90 Base) MCG/ACT inhaler, Inhale 2 puffs into the lungs every 4 hours as needed, Disp: , Rfl:      albuterol 90 MCG/ACT inhaler, Inhale 2 puffs into the lungs every 6 hours as needed, Disp: , Rfl:      cetirizine (ZYRTEC) 10 MG tablet, Take 10 mg by mouth daily., Disp: , Rfl:      doxycycline hyclate (VIBRAMYCIN) 100 MG capsule, TAKE 1 CAPSULE BY MOUTH TWICE A DAY, Disp: , Rfl:      EPINEPHrine (EPIPEN JR) 0.15 MG/0.3ML injection, Inject 0.15 mg into the muscle as needed, Disp: , Rfl:      fluticasone (VERAMYST) 27.5 MCG/SPRAY spray, Spray 2 sprays into both nostrils daily, Disp: , Rfl:      lisdexamfetamine (VYVANSE) 30 MG capsule, Take 1 capsule (30 mg) by mouth every morning, Disp: 30 capsule, Rfl: 0     montelukast (SINGULAIR) 10 MG tablet, TAKE 1 TABLET BY MOUTH EVERY DAY IN THE MORNING., Disp: , Rfl: 3     omeprazole (PRILOSEC) 40 MG DR capsule, Take 1 capsule (40 mg) by mouth daily, Disp: 90 capsule, Rfl: 3     sertraline (ZOLOFT) 100 MG tablet, TAKE 2 TABLETS BY MOUTH EVERY DAY, Disp: 60 tablet, Rfl: 11    On exam  /83 (BP Location: Right  TC received via result notes: Please call Kaleigh and let her know that A1C is normal. No diabetes or prediabetes. Thanks!    "arm, Patient Position: Sitting, Cuff Size: Adult Regular)   Pulse 73   Resp 16   Ht 1.676 m (5' 6\")   Wt 118.8 kg (262 lb)   SpO2 97%   Breastfeeding No   BMI 42.29 kg/m     Cor RRR  Lungs coarse sounding breath sounds, but CTA, no focal crackles or wheezing noted      A/P 46 year old here with prolonged cough after what sounds like a viral URI.  With no wheezing and normal sats, I do not think she needs a prednisone burst.  I will obtain a chest xray to rule out pneumonia.      Cough  -     XR Chest 2 Views; this was reviewed by me and was clear  --Given symptoms are improving (albeit slowly) and the chest xray was negative, I would prefer to hold off on additional antibiotics, and instead switch to levocetirizine for allergies and use mucinex as needed.    Manisha Matos MD    "